# Patient Record
Sex: MALE | Race: WHITE | Employment: FULL TIME | URBAN - METROPOLITAN AREA
[De-identification: names, ages, dates, MRNs, and addresses within clinical notes are randomized per-mention and may not be internally consistent; named-entity substitution may affect disease eponyms.]

---

## 2022-07-25 ENCOUNTER — HOSPITAL ENCOUNTER (EMERGENCY)
Age: 47
Discharge: HOME | End: 2022-07-25
Attending: EMERGENCY MEDICINE
Payer: COMMERCIAL

## 2022-07-25 VITALS
SYSTOLIC BLOOD PRESSURE: 137 MMHG | TEMPERATURE: 98.2 F | DIASTOLIC BLOOD PRESSURE: 94 MMHG | OXYGEN SATURATION: 98 % | RESPIRATION RATE: 20 BRPM | HEART RATE: 64 BPM

## 2022-07-25 DIAGNOSIS — T16.1XXA ACUTE FOREIGN BODY OF RIGHT EAR CANAL, INITIAL ENCOUNTER: Primary | ICD-10-CM

## 2022-07-25 PROCEDURE — 99282 EMERGENCY DEPT VISIT SF MDM: CPT

## 2022-07-25 PROCEDURE — 69200 CLEAR OUTER EAR CANAL: CPT

## 2022-07-25 ASSESSMENT — PAIN - FUNCTIONAL ASSESSMENT: PAIN_FUNCTIONAL_ASSESSMENT: NONE - DENIES PAIN

## 2022-07-26 NOTE — ED PROVIDER NOTES
[id number] [facility] RESIDENT NOTE       Date of evaluation: 7/25/2022    Chief Complaint     Foreign Body (Earbud in right ear)    History of Present Illness     Semaj Kang is a 51 y.o. male with PMHx of Gilbert's disease who presents with foreign body. Patient got an ear bud stuck in his right ear when he was attempting to use a pair of earphones at home. Patient removed his earphones, and the earbud got stuck in his right ear. Patient states this is happened before but he is normally been able to remove the earbud. This time he was unable to and therefore his wife tried with tweezers but was unable and it seems as though the earbud got pushed further and. As neither him nor his wife were able to remove it, he presented the emergency department for removal. Patient denies any pain, discharge, or changes in hearing. Patient has no other concerns. Review of Systems     Review of Systems   HENT:  Negative for ear pain and hearing loss. All other systems reviewed and are negative. Past Medical, Surgical, Family, and Social History     He has no past medical history on file. He has no past surgical history on file. His family history is not on file. He reports that he has never smoked. He has never used smokeless tobacco. He reports that he does not currently use alcohol. He reports that he does not use drugs. Medications     Previous Medications    No medications on file     Allergies     He has No Known Allergies. Physical Exam     INITIAL VITALS: BP: (!) 137/94, Temp: 98.2 °F (36.8 °C), Heart Rate: 64, Resp: 20, SpO2: 98 %   Physical Exam  Vitals reviewed. Constitutional:       General: He is not in acute distress. Appearance: Normal appearance. He is normal weight. He is not ill-appearing or toxic-appearing. HENT:      Head: Normocephalic and atraumatic. Right Ear: Tympanic membrane and external ear normal. A foreign body is present. Left Ear: Tympanic membrane, ear canal and external ear normal. No foreign body. Ears:      Comments: Ear bud tip in R ear canal     Nose: Nose normal.      Mouth/Throat:      Mouth: Mucous membranes are moist.   Eyes:      Conjunctiva/sclera: Conjunctivae normal.      Pupils: Pupils are equal, round, and reactive to light. Musculoskeletal:      Cervical back: Normal range of motion and neck supple. Skin:     General: Skin is warm. Capillary Refill: Capillary refill takes less than 2 seconds. Neurological:      General: No focal deficit present. Mental Status: He is alert. Psychiatric:         Mood and Affect: Mood normal.         Behavior: Behavior normal.     DiagnosticResults     RADIOLOGY:  No orders to display     LABS:   No results found for this visit on 07/25/22. RECENT VITALS:  BP: (!) 137/94, Temp: 98.2 °F (36.8 °C), Heart Rate: 64,Resp: 20, SpO2: 98 %     Procedures     Foreign Body    Date/Time: 7/25/2022 10:28 PM  Performed by: Shakeel Dunne MD  Authorized by: Ronna Hurd MD     Consent:     Consent obtained:  Verbal    Consent given by:  Patient    Risks, benefits, and alternatives were discussed: yes      Risks discussed:  Incomplete removal, pain and bleeding    Alternatives discussed:  No treatment  Universal protocol:     Patient identity confirmed:  Verbally with patient  Location:     Location:  Ear    Ear location:  R ear    Depth: in canal.  Pre-procedure details:     Imaging:  None    Neurovascular status: intact    Anesthesia:     Anesthesia method:  None  Post-procedure details:     Procedure completion:  Tolerated  Comments:      Earbud was visualized in the right ear canal. Alligator forceps use under otoscope guidance, earbud was removed. External auditory canal without invariant, laceration, or surrounding erythema. ED Course     Nursing Notes, Past Medical Hx, Past Surgical Hx, Social Hx, Allergies, and Family Hx were reviewed.     The patient was given the followingmedications:  No orders of the defined types were placed in this encounter. CONSULTS:  None    MEDICAL DECISION MAKING / ASSESSMENT / PLAN     Ric Bowser is a 51 y.o. male who presents with earbud in his right ear without any HEENT deficits. Your blood was visualized under otoscope and patient tolerated removal with alligator forceps. External auditory canal intact without any evidence of erythema or trauma. TM clear. Patient also instructed to follow-up with their PCP regarding today's ED visit. Discharged in stable condition with written and verbal instructions for which to return to the ED. This patient was also evaluated by the attending physician. All care plans were discussed and agreed upon. Clinical Impression     1. Acute foreign body of right ear canal, initial encounter      Disposition     PATIENT REFERRED TO:  No follow-up provider specified.     DISCHARGE MEDICATIONS:  New Prescriptions    No medications on file     DISPOSITION Decision To Discharge 07/25/2022 10:24:58 Oswaldo Smith MD  Resident  07/25/22 [id number]

## 2022-07-26 NOTE — ED PROVIDER NOTES
ED Attending Attestation Note     Date of evaluation: 7/25/2022    This patient was seen by the resident. I have seen and examined the patient, agree with the workup, evaluation, management and diagnosis. The care plan has been discussed. My assessment reveals well-appearing male who had an ear bud stuck in his right ear. This was easily removed by resident.   Here TM is clean with no erythema no foreign body present after removal.     Darius Fu MD  07/25/22 [id number]

## 2022-07-26 NOTE — DISCHARGE INSTRUCTIONS
You were seen in the ED for a foreign body in your right ear. This was removed. Your ear canal was not irritated and your eardrum looked intact. Please follow-up with your PCP or the emergency department if you have worsening right ear pain, fevers, swelling around your ear, neck stiffness, worsening headache.